# Patient Record
Sex: MALE | Race: WHITE | NOT HISPANIC OR LATINO | Employment: UNEMPLOYED | URBAN - METROPOLITAN AREA
[De-identification: names, ages, dates, MRNs, and addresses within clinical notes are randomized per-mention and may not be internally consistent; named-entity substitution may affect disease eponyms.]

---

## 2018-09-06 ENCOUNTER — HOSPITAL ENCOUNTER (EMERGENCY)
Facility: MEDICAL CENTER | Age: 28
End: 2018-09-06
Attending: EMERGENCY MEDICINE

## 2018-09-06 VITALS
WEIGHT: 295 LBS | HEART RATE: 98 BPM | OXYGEN SATURATION: 97 % | HEIGHT: 78 IN | TEMPERATURE: 98.9 F | DIASTOLIC BLOOD PRESSURE: 91 MMHG | SYSTOLIC BLOOD PRESSURE: 143 MMHG | BODY MASS INDEX: 34.13 KG/M2 | RESPIRATION RATE: 18 BRPM

## 2018-09-06 DIAGNOSIS — R56.9 SEIZURE (HCC): ICD-10-CM

## 2018-09-06 DIAGNOSIS — F10.10 ALCOHOL ABUSE: ICD-10-CM

## 2018-09-06 LAB
ALBUMIN SERPL BCP-MCNC: 4.5 G/DL (ref 3.2–4.9)
ALBUMIN/GLOB SERPL: 1.7 G/DL
ALP SERPL-CCNC: 48 U/L (ref 30–99)
ALT SERPL-CCNC: 57 U/L (ref 2–50)
ANION GAP SERPL CALC-SCNC: 8 MMOL/L (ref 0–11.9)
AST SERPL-CCNC: 42 U/L (ref 12–45)
BILIRUB SERPL-MCNC: 0.3 MG/DL (ref 0.1–1.5)
BUN SERPL-MCNC: 9 MG/DL (ref 8–22)
CALCIUM SERPL-MCNC: 9.4 MG/DL (ref 8.5–10.5)
CHLORIDE SERPL-SCNC: 107 MMOL/L (ref 96–112)
CO2 SERPL-SCNC: 30 MMOL/L (ref 20–33)
CREAT SERPL-MCNC: 1.11 MG/DL (ref 0.5–1.4)
GLOBULIN SER CALC-MCNC: 2.7 G/DL (ref 1.9–3.5)
GLUCOSE SERPL-MCNC: 103 MG/DL (ref 65–99)
POTASSIUM SERPL-SCNC: 3.8 MMOL/L (ref 3.6–5.5)
PROT SERPL-MCNC: 7.2 G/DL (ref 6–8.2)
SODIUM SERPL-SCNC: 145 MMOL/L (ref 135–145)

## 2018-09-06 PROCEDURE — 700102 HCHG RX REV CODE 250 W/ 637 OVERRIDE(OP): Performed by: EMERGENCY MEDICINE

## 2018-09-06 PROCEDURE — 80053 COMPREHEN METABOLIC PANEL: CPT

## 2018-09-06 PROCEDURE — 36415 COLL VENOUS BLD VENIPUNCTURE: CPT

## 2018-09-06 PROCEDURE — 99284 EMERGENCY DEPT VISIT MOD MDM: CPT

## 2018-09-06 PROCEDURE — A9270 NON-COVERED ITEM OR SERVICE: HCPCS | Performed by: EMERGENCY MEDICINE

## 2018-09-06 RX ORDER — CHLORDIAZEPOXIDE HYDROCHLORIDE 25 MG/1
50 CAPSULE, GELATIN COATED ORAL ONCE
Status: COMPLETED | OUTPATIENT
Start: 2018-09-06 | End: 2018-09-06

## 2018-09-06 RX ADMIN — CHLORDIAZEPOXIDE HYDROCHLORIDE 50 MG: 25 CAPSULE ORAL at 10:56

## 2018-09-06 ASSESSMENT — LIFESTYLE VARIABLES
HAVE PEOPLE ANNOYED YOU BY CRITICIZING YOUR DRINKING: YES
TOTAL SCORE: 4
DOES PATIENT WANT TO STOP DRINKING: NO
CONSUMPTION TOTAL: INCOMPLETE
EVER HAD A DRINK FIRST THING IN THE MORNING TO STEADY YOUR NERVES TO GET RID OF A HANGOVER: YES
EVER FELT BAD OR GUILTY ABOUT YOUR DRINKING: YES
TOTAL SCORE: 4
HAVE YOU EVER FELT YOU SHOULD CUT DOWN ON YOUR DRINKING: YES
TOTAL SCORE: 4
DO YOU DRINK ALCOHOL: YES

## 2018-09-06 NOTE — ED NOTES
Discharge instructions given, pt verbalized understanding.  A&ox4.  VSS.  Ambulates out of ER with wife.  Pt given lunch box.

## 2018-09-06 NOTE — DISCHARGE INSTRUCTIONS
Seizure, Adult  A seizure is a sudden burst of abnormal electrical activity in the brain. The abnormal activity temporarily interrupts normal brain function, causing a person to experience any of the following:  · Involuntary movements.  · Changes in awareness or consciousness.  · Uncontrollable shaking (convulsions).  Seizures usually last from 30 seconds to 2 minutes. They usually do not cause permanent brain damage unless they are prolonged.  What can cause a seizure to happen?  Seizures can happen for many reasons including:  · A fever.  · Low blood sugar.  · A medicine.  · An illnesses.  · A brain injury.  Some people who have a seizure never have another one. People who have repeated seizures have a condition called epilepsy.  What are the symptoms of a seizure?  Symptoms of a seizure vary greatly from person to person. They include:  · Convulsions.  · Stiffening of the body.  · Involuntary movements of the arms or legs.  · Loss of consciousness.  · Breathing problems.  · Falling suddenly.  · Confusion.  · Head nodding.  · Eye blinking or fluttering.  · Lip smacking.  · Drooling.  · Rapid eye movements.  · Grunting.  · Loss of bladder control and bowel control.  · Staring.  · Unresponsiveness.  Some people have symptoms right before a seizure happens (aura) and right after a seizure happens. Symptoms of an aura include:  · Fear or anxiety.  · Nausea.  · Feeling like the room is spinning (vertigo).  · A feeling of having seen or heard something before (demetria vu).  · Odd tastes or smells.  · Changes in vision, such as seeing flashing lights or spots.  Symptoms that may follow a seizure include:  · Confusion.  · Sleepiness.  · Headache.  · Weakness of one side of the body.  Follow these instructions at home:  Medicines  · Take over-the-counter and prescription medicines only as told by your health care provider.  · Avoid any substances that may prevent your medicine from working properly, such as  alcohol.  Activity  · Do not drive, swim, or do any other activities that would be dangerous if you had another seizure. Wait until your health care provider approves.  · If you live in the [place] check with your local DMV (department of motor vehicles) to find out about the local driving laws. Each state has specific rules about when you can legally return to driving.  · Get enough rest. Lack of sleep can make seizures more likely to occur.  Educating others  Teach friends and family what to do if you have a seizure. They should:  · Lay you on the ground to prevent a fall.  · Cushion your head and body.  · Loosen any tight clothing around your neck.  · Turn you on your side. If vomiting occurs, this helps keep your airway clear.  · Stay with you until you recover.  · Not hold you down. Holding you down will not stop the seizure.  · Not put anything in your mouth.  · Know whether or not you need emergency care.  General instructions  · Contact your health care provider each time you have a seizure.  · Avoid anything that has ever triggered a seizure for you.  · Keep a seizure diary. Record what you remember about each seizure, especially anything that might have triggered the seizure.  · Keep all follow-up visits as told by your health care provider. This is important.  Contact a health care provider if:  · You have another seizure.  · You have seizures more often.  · Your seizure symptoms change.  · You continue to have seizures with treatment.  · You have symptoms of an infection or illness. They might increase your risk of having a seizure.  Get help right away if:  · You have a seizure:  ¨ That lasts longer than 5 minutes.  ¨ That is different than previous seizures.  ¨ That leaves you unable to speak or use a part of your body.  ¨ That makes it harder to breathe.  ¨ After a head injury.  · You have:  ¨ Multiple seizures in a row.  ¨ Confusion or a severe headache right after a seizure.  · You are having seizures  more often.  · You do not wake up immediately after a seizure.  · You injure yourself during a seizure.  These symptoms may represent a serious problem that is an emergency. Do not wait to see if the symptoms will go away. Get medical help right away. Call your local emergency services (911 in the [place] Do not drive yourself to the hospital.   This information is not intended to replace advice given to you by your health care provider. Make sure you discuss any questions you have with your health care provider.  Document Released: 12/15/2001 Document Revised: 08/13/2017 Document Reviewed: 07/21/2017  [employer] Interactive Patient Education © 2017 [employer]      Alcohol Problems  Most adults who drink alcohol drink in moderation (not a lot) are at low risk for developing problems related to their drinking. However, all drinkers, including low-risk drinkers, should know about the health risks connected with drinking alcohol.  RECOMMENDATIONS FOR LOW-RISK DRINKING   Drink in moderation. Moderate drinking is defined as follows:   · Men - no more than 2 drinks per day.  · Nonpregnant women - no more than 1 drink per day.  · Over age 65 - no more than 1 drink per day.  A standard drink is 12 grams of pure alcohol, which is equal to a 12 ounce bottle of beer or wine cooler, a 5 ounce glass of wine, or 1.5 ounces of distilled spirits (such as whiskey, francisca, vodka, or rum).   ABSTAIN FROM (DO NOT DRINK) ALCOHOL:  · When pregnant or considering pregnancy.  · When taking a medication that interacts with alcohol.  · If you are alcohol dependent.  · A medical condition that prohibits drinking alcohol (such as ulcer, liver disease, or heart disease).  DISCUSS WITH YOUR CAREGIVER:  · If you are at risk for coronary heart disease, discuss the potential benefits and risks of alcohol use: Light to moderate drinking is associated with lower rates of coronary heart disease in certain populations (for example, men over age 45 and  postmenopausal women). Infrequent or nondrinkers are advised not to begin light to moderate drinking to reduce the risk of coronary heart disease so as to avoid creating an alcohol-related problem. Similar protective effects can likely be gained through proper diet and exercise.  · Women and the elderly have smaller amounts of body water than men. As a result women and the elderly achieve a higher blood alcohol concentration after drinking the same amount of alcohol.  · Exposing a fetus to alcohol can cause a broad range of birth defects referred to as Fetal Alcohol Syndrome (FAS) or Alcohol-Related Birth Defects (ARBD). Although FAS/ARBD is connected with excessive alcohol consumption during pregnancy, studies also have reported neurobehavioral problems in infants born to mothers reporting drinking an average of 1 drink per day during pregnancy.  · Heavier drinking (the consumption of more than 4 drinks per occasion by men and more than 3 drinks per occasion by women) impairs learning (cognitive) and psychomotor functions and increases the risk of alcohol-related problems, including accidents and injuries.  CAGE QUESTIONS:   · Have you ever felt that you should Cut down on your drinking?  · Have people Annoyed you by criticizing your drinking?  · Have you ever felt bad or Guilty about your drinking?  · Have you ever had a drink first thing in the morning to steady your nerves or get rid of a hangover (Eye opener)?  If you answered positively to any of these questions: You may be at risk for alcohol-related problems if alcohol consumption is:   · Men: Greater than 14 drinks per week or more than 4 drinks per occasion.  · Women: Greater than 7 drinks per week or more than 3 drinks per occasion.  Do you or your family have a medical history of alcohol-related problems, such as:  · Blackouts.  · Sexual dysfunction.  · Depression.  · Trauma.  · Liver dysfunction.  · Sleep disorders.  · Hypertension.  · Chronic abdominal  pain.  · Has your drinking ever caused you problems, such as problems with your family, problems with your work (or school) performance, or accidents/injuries?  · Do you have a compulsion to drink or a preoccupation with drinking?  · Do you have poor control or are you unable to stop drinking once you have started?  · Do you have to drink to avoid withdrawal symptoms?  · Do you have problems with withdrawal such as tremors, nausea, sweats, or mood disturbances?  · Does it take more alcohol than in the past to get you high?  · Do you feel a strong urge to drink?  · Do you change your plans so that you can have a drink?  · Do you ever drink in the morning to relieve the shakes or a hangover?  If you have answered a number of the previous questions positively, it may be time for you to talk to your caregivers, family, and friends and see if they think you have a problem. Alcoholism is a chemical dependency that keeps getting worse and will eventually destroy your health and relationships. Many alcoholics end up dead, impoverished, or in FDC. This is often the end result of all chemical dependency.  · Do not be discouraged if you are not ready to take action immediately.  · Decisions to change behavior often involve up and down desires to change and feeling like you cannot decide.  · Try to think more seriously about your drinking behavior.  · Think of the reasons to quit.  WHERE TO GO FOR ADDITIONAL INFORMATION   · The National [place] on Alcohol Abuse and Alcoholism (NIAAA)  www.niaaa.nih.gov  · [place] on Alcoholism and Drug Dependence (NCADD)  www.ncadd.org  · American Society of Addiction Medicine (ASAM)  www.asam.org   Document Released: 12/18/2006 Document Revised: 03/11/2013 Document Reviewed: 08/05/2009  [employer]® Patient Information ©2014 [employer].

## 2018-09-06 NOTE — ED PROVIDER NOTES
ED Provider Note    Scribed for Darell Us M.D. by Jorge Holley. 9/6/2018  10:46 AM    Primary care provider: Pcp Pt States None  Means of arrival: EMS  History obtained from: patient  History limited by: none    CHIEF COMPLAINT  Chief Complaint   Patient presents with   • Seizure     witnessed by friend, approx 45 seconds       HPI  Crow Edwards is a 28 y.o. male with a history of history of withdrawal seizures who presents to the Emergency Department for evaluation post sudden seizure just prior to arrival. Wife repots associated tremors, unresponsiveness. Patient reports a history of heavy alcohol abuse. He states that he was incarcerated for 72 hours after a night of heavy drinking. Wife reports that the patient was at the [facility] today after being released from [facility] when he started to become tremulous. She states that he was given an alcoholic beverage which seemed to improve his symptoms. His last alcohol beverage was at 9 am. Patient was sitting at breakfast when he suddenly became tremulous and unresponsive for 45 seconds. She reports 3 separate episodes of seizures over a 10 minute time period. Patient denies headache, vomiting.     REVIEW OF SYSTEMS  Pertinent positives include seizure, tremors, unresponsiveness. Pertinent negatives include no headache, vomiting.  All other systems reviewed and negative.  C.    PAST MEDICAL HISTORY   withdrawal seizures     SURGICAL HISTORY  patient denies any surgical history    SOCIAL HISTORY  Social History   Substance Use Topics   • Smoking status: Light Tobacco Smoker   • Smokeless tobacco: Not on file   • Alcohol use Yes      Comment: daily      History   Drug Use No       FAMILY HISTORY  History reviewed. No pertinent family history.    CURRENT MEDICATIONS  Home Medications     Reviewed by Tami Ray R.N. (Registered Nurse) on 09/06/18 at 1022  Med List Status: Complete   Medication Last Dose Status        Patient Bhavesh Taking any  "Medications                       ALLERGIES  No Known Allergies    PHYSICAL EXAM  VITAL SIGNS: /91   Pulse 86   Temp 37.2 °C (98.9 °F)   Resp 20   Ht 1.981 m (6' 6\")   Wt (!) 133.8 kg (295 lb)   SpO2 95%   BMI 34.09 kg/m²     Constitutional: Well developed, Well nourished, Mild to moderate distress, Non-toxic appearance.   HENT: Normocephalic, Atraumatic, Bilateral external ears normal, Oropharynx moist, No oral exudates.   Eyes: PERRLA, EOMI, Conjunctiva normal, No discharge.   Neck: No tenderness, Supple, No stridor.   Lymphatic: No lymphadenopathy noted.   Cardiovascular: Normal heart rate, Normal rhythm.   Thorax & Lungs: Clear to auscultation bilaterally, No respiratory distress, No wheezing, No crackles.   Abdomen: Soft, No tenderness, No masses, No pulsatile masses.   Skin: Warm, Dry, No erythema, No rash.   Extremities:, No edema No cyanosis.   Musculoskeletal: No tenderness to palpation or major deformities noted.  Intact distal pulses  Neurologic: Awake, alert. Moves all extremities spontaneously. Oriented to 4  Psychiatric: Anxious, Judgment normal, Mood normal.     LABS  Labs Reviewed   COMP METABOLIC PANEL - Abnormal; Notable for the following:        Result Value    Glucose 103 (*)     ALT(SGPT) 57 (*)     All other components within normal limits   ESTIMATED GFR   All labs reviewed by me.    COURSE & MEDICAL DECISION MAKING  Pertinent Labs & Imaging studies reviewed. (See chart for details)    10:46 AM - Patient seen and examined at bedside. He is expressing a desire to leave AMA. I discussed evaluation today to rule out emergent processes. I encouraged the patient to remain for these studies and to allow treatment with Ativan or librium in order to prevent further episodes of seizures. Wife is also concerned for the patient and attempting to get him to remain in the ED. Patient agrees to remain for the course of the diagnostics and to PO Librium, Patient will be treated with Librium 50 " "mg. Ordered CMP to evaluate his symptoms. The differential diagnoses include but are not limited to: alcohol withdrawal seizure, electrolyte imbalance, substance abuse    11:41 AM - Recheck: Patient re-evaluated at [facility]. Patient reports feeling good and ready for discharge. Patient's lab and radiology results discussed. Discussed patient's condition and treatment plan. Patient will be discharged with instructions and provided with strict return precautions. Advised to follow up with a neurologist. Instructed to return to Emergency Department immediately if any new or worsening symptoms.    Discharge vitals: /91   Pulse 98   Temp 37.2 °C (98.9 °F)   Resp 18   Ht 1.981 m (6' 6\")   Wt (!) 133.8 kg (295 lb)   SpO2 97%   BMI 34.09 kg/m²     Decision Making:  Patient with alcohol abuse, likely seizure secondary to heavy alcohol intoxication, no evidence of any trauma.  The patient is agitated, the electrolytes are unremarkable, the patient has no desire to quit drinking alcohol, to the patient will be discharged home, return with any other concerns.    The patient will return for new or worsening symptoms and is stable at the time of discharge.    The patient is referred to a primary physician for blood pressure management, diabetic screening, and for all other preventative health concerns.    DISPOSITION:  Patient will be discharged home in stable condition.    FOLLOW UP:  [facility], Emergency Dept  [address] [address] [phone]    If symptoms worsen    Bhavik Hatfield M.D.   [address]  [address] [address] [phone]          FINAL IMPRESSION  1. Seizure (HCC)    2. Alcohol abuse          Jorge BURGOS ([place]), am scribing for, and in the presence of, Darell Us M.D..    Electronically signed by: Jorge Holley ([place]), 9/6/2018    Darell BURGOS M.D. personally performed the services described in this documentation, as " scribed by Jorge Holley in my presence, and it is both accurate and complete.    The note accurately reflects work and decisions made by me.  Darell Us  9/6/2018  6:19 PM

## 2018-09-06 NOTE — ED NOTES
"Pt bib [facility] from [facility] with c/c witness seizure x45 seconds.  Pt history of seizures due to alcohol abuse.  Pt released from [facility] last night (in [facility] for 3 days) & \"drank heavily last night.\"  Per EMS, pt confused on scene.  Pt arrives a&ox4, \"angry\" he is in the hospital.  Pt received 4mg zofran & 300mL normal saline by medics PTA.  On cardiac monitor.  Blood drawn & sent to lab.  Chart up for ERP evaluation.   "

## 2018-09-07 ENCOUNTER — HOSPITAL ENCOUNTER (INPATIENT)
Facility: MEDICAL CENTER | Age: 28
LOS: 2 days | DRG: 101 | End: 2018-09-09
Attending: EMERGENCY MEDICINE | Admitting: HOSPITALIST
Payer: MEDICAID

## 2018-09-07 ENCOUNTER — APPOINTMENT (OUTPATIENT)
Dept: RADIOLOGY | Facility: MEDICAL CENTER | Age: 28
DRG: 101 | End: 2018-09-07
Attending: EMERGENCY MEDICINE
Payer: MEDICAID

## 2018-09-07 DIAGNOSIS — R56.9 SEIZURE (HCC): ICD-10-CM

## 2018-09-07 DIAGNOSIS — F10.20 ALCOHOLISM (HCC): ICD-10-CM

## 2018-09-07 DIAGNOSIS — F10.939 ALCOHOL WITHDRAWAL SYNDROME WITH COMPLICATION (HCC): ICD-10-CM

## 2018-09-07 PROBLEM — Z72.0 TOBACCO ABUSE: Status: ACTIVE | Noted: 2018-09-07

## 2018-09-07 PROBLEM — E87.20 ACIDOSIS: Status: ACTIVE | Noted: 2018-09-07

## 2018-09-07 PROBLEM — K70.10 ALCOHOLIC HEPATITIS: Status: ACTIVE | Noted: 2018-09-07

## 2018-09-07 LAB
ALBUMIN SERPL BCP-MCNC: 4.5 G/DL (ref 3.2–4.9)
ALBUMIN/GLOB SERPL: 1.4 G/DL
ALP SERPL-CCNC: 52 U/L (ref 30–99)
ALT SERPL-CCNC: 66 U/L (ref 2–50)
ANION GAP SERPL CALC-SCNC: 17 MMOL/L (ref 0–11.9)
AST SERPL-CCNC: 70 U/L (ref 12–45)
BASOPHILS # BLD AUTO: 0.8 % (ref 0–1.8)
BASOPHILS # BLD: 0.06 K/UL (ref 0–0.12)
BILIRUB SERPL-MCNC: 0.4 MG/DL (ref 0.1–1.5)
BUN SERPL-MCNC: 14 MG/DL (ref 8–22)
CALCIUM SERPL-MCNC: 9.2 MG/DL (ref 8.5–10.5)
CHLORIDE SERPL-SCNC: 108 MMOL/L (ref 96–112)
CO2 SERPL-SCNC: 19 MMOL/L (ref 20–33)
CREAT SERPL-MCNC: 0.8 MG/DL (ref 0.5–1.4)
EOSINOPHIL # BLD AUTO: 0.19 K/UL (ref 0–0.51)
EOSINOPHIL NFR BLD: 2.4 % (ref 0–6.9)
ERYTHROCYTE [DISTWIDTH] IN BLOOD BY AUTOMATED COUNT: 43.2 FL (ref 35.9–50)
ETHANOL BLD-MCNC: 0.31 G/DL
GLOBULIN SER CALC-MCNC: 3.2 G/DL (ref 1.9–3.5)
GLUCOSE SERPL-MCNC: 105 MG/DL (ref 65–99)
HCT VFR BLD AUTO: 48 % (ref 42–52)
HGB BLD-MCNC: 16 G/DL (ref 14–18)
IMM GRANULOCYTES # BLD AUTO: 0.04 K/UL (ref 0–0.11)
IMM GRANULOCYTES NFR BLD AUTO: 0.5 % (ref 0–0.9)
LYMPHOCYTES # BLD AUTO: 3.13 K/UL (ref 1–4.8)
LYMPHOCYTES NFR BLD: 39.9 % (ref 22–41)
MCH RBC QN AUTO: 31.4 PG (ref 27–33)
MCHC RBC AUTO-ENTMCNC: 33.3 G/DL (ref 33.7–35.3)
MCV RBC AUTO: 94.1 FL (ref 81.4–97.8)
MONOCYTES # BLD AUTO: 0.71 K/UL (ref 0–0.85)
MONOCYTES NFR BLD AUTO: 9 % (ref 0–13.4)
NEUTROPHILS # BLD AUTO: 3.72 K/UL (ref 1.82–7.42)
NEUTROPHILS NFR BLD: 47.4 % (ref 44–72)
NRBC # BLD AUTO: 0 K/UL
NRBC BLD-RTO: 0 /100 WBC
PLATELET # BLD AUTO: 287 K/UL (ref 164–446)
PMV BLD AUTO: 9.3 FL (ref 9–12.9)
POTASSIUM SERPL-SCNC: 4.7 MMOL/L (ref 3.6–5.5)
PROT SERPL-MCNC: 7.7 G/DL (ref 6–8.2)
RBC # BLD AUTO: 5.1 M/UL (ref 4.7–6.1)
SODIUM SERPL-SCNC: 144 MMOL/L (ref 135–145)
TSH SERPL DL<=0.005 MIU/L-ACNC: 1.1 UIU/ML (ref 0.38–5.33)
WBC # BLD AUTO: 7.9 K/UL (ref 4.8–10.8)

## 2018-09-07 PROCEDURE — 83036 HEMOGLOBIN GLYCOSYLATED A1C: CPT

## 2018-09-07 PROCEDURE — 70450 CT HEAD/BRAIN W/O DYE: CPT

## 2018-09-07 PROCEDURE — A9270 NON-COVERED ITEM OR SERVICE: HCPCS | Performed by: HOSPITALIST

## 2018-09-07 PROCEDURE — 93005 ELECTROCARDIOGRAM TRACING: CPT | Performed by: EMERGENCY MEDICINE

## 2018-09-07 PROCEDURE — 770020 HCHG ROOM/CARE - TELE (206)

## 2018-09-07 PROCEDURE — 700101 HCHG RX REV CODE 250: Performed by: HOSPITALIST

## 2018-09-07 PROCEDURE — HZ2ZZZZ DETOXIFICATION SERVICES FOR SUBSTANCE ABUSE TREATMENT: ICD-10-PCS | Performed by: HOSPITALIST

## 2018-09-07 PROCEDURE — 96365 THER/PROPH/DIAG IV INF INIT: CPT

## 2018-09-07 PROCEDURE — 99407 BEHAV CHNG SMOKING > 10 MIN: CPT | Performed by: HOSPITALIST

## 2018-09-07 PROCEDURE — 700102 HCHG RX REV CODE 250 W/ 637 OVERRIDE(OP): Performed by: HOSPITALIST

## 2018-09-07 PROCEDURE — 700105 HCHG RX REV CODE 258: Performed by: HOSPITALIST

## 2018-09-07 PROCEDURE — 700111 HCHG RX REV CODE 636 W/ 250 OVERRIDE (IP): Performed by: HOSPITALIST

## 2018-09-07 PROCEDURE — 85025 COMPLETE CBC W/AUTO DIFF WBC: CPT

## 2018-09-07 PROCEDURE — 80053 COMPREHEN METABOLIC PANEL: CPT

## 2018-09-07 PROCEDURE — 700101 HCHG RX REV CODE 250: Performed by: EMERGENCY MEDICINE

## 2018-09-07 PROCEDURE — 99223 1ST HOSP IP/OBS HIGH 75: CPT | Mod: 25 | Performed by: HOSPITALIST

## 2018-09-07 PROCEDURE — 80307 DRUG TEST PRSMV CHEM ANLYZR: CPT

## 2018-09-07 PROCEDURE — 99285 EMERGENCY DEPT VISIT HI MDM: CPT

## 2018-09-07 PROCEDURE — 84443 ASSAY THYROID STIM HORMONE: CPT

## 2018-09-07 RX ORDER — LORAZEPAM 1 MG/1
2 TABLET ORAL
Status: DISCONTINUED | OUTPATIENT
Start: 2018-09-07 | End: 2018-09-09 | Stop reason: HOSPADM

## 2018-09-07 RX ORDER — CHLORDIAZEPOXIDE HYDROCHLORIDE 25 MG/1
50 CAPSULE, GELATIN COATED ORAL EVERY 6 HOURS
Status: DISPENSED | OUTPATIENT
Start: 2018-09-07 | End: 2018-09-08

## 2018-09-07 RX ORDER — LORAZEPAM 2 MG/ML
1.5 INJECTION INTRAMUSCULAR
Status: DISCONTINUED | OUTPATIENT
Start: 2018-09-07 | End: 2018-09-09 | Stop reason: HOSPADM

## 2018-09-07 RX ORDER — POLYETHYLENE GLYCOL 3350 17 G/17G
1 POWDER, FOR SOLUTION ORAL
Status: DISCONTINUED | OUTPATIENT
Start: 2018-09-07 | End: 2018-09-09 | Stop reason: HOSPADM

## 2018-09-07 RX ORDER — LORAZEPAM 0.5 MG/1
0.5 TABLET ORAL EVERY 4 HOURS PRN
Status: DISCONTINUED | OUTPATIENT
Start: 2018-09-07 | End: 2018-09-09 | Stop reason: HOSPADM

## 2018-09-07 RX ORDER — LORAZEPAM 2 MG/ML
2 INJECTION INTRAMUSCULAR
Status: DISCONTINUED | OUTPATIENT
Start: 2018-09-07 | End: 2018-09-09 | Stop reason: HOSPADM

## 2018-09-07 RX ORDER — AMOXICILLIN 250 MG
2 CAPSULE ORAL 2 TIMES DAILY
Status: DISCONTINUED | OUTPATIENT
Start: 2018-09-07 | End: 2018-09-09 | Stop reason: HOSPADM

## 2018-09-07 RX ORDER — LORAZEPAM 1 MG/1
4 TABLET ORAL
Status: DISCONTINUED | OUTPATIENT
Start: 2018-09-07 | End: 2018-09-09 | Stop reason: HOSPADM

## 2018-09-07 RX ORDER — THIAMINE MONONITRATE (VIT B1) 100 MG
100 TABLET ORAL DAILY
Status: DISCONTINUED | OUTPATIENT
Start: 2018-09-08 | End: 2018-09-09 | Stop reason: HOSPADM

## 2018-09-07 RX ORDER — FOLIC ACID 1 MG/1
1 TABLET ORAL DAILY
Status: DISCONTINUED | OUTPATIENT
Start: 2018-09-08 | End: 2018-09-09 | Stop reason: HOSPADM

## 2018-09-07 RX ORDER — BISACODYL 10 MG
10 SUPPOSITORY, RECTAL RECTAL
Status: DISCONTINUED | OUTPATIENT
Start: 2018-09-07 | End: 2018-09-09 | Stop reason: HOSPADM

## 2018-09-07 RX ORDER — LORAZEPAM 2 MG/ML
1 INJECTION INTRAMUSCULAR
Status: DISCONTINUED | OUTPATIENT
Start: 2018-09-07 | End: 2018-09-09 | Stop reason: HOSPADM

## 2018-09-07 RX ORDER — LORAZEPAM 1 MG/1
1 TABLET ORAL EVERY 4 HOURS PRN
Status: DISCONTINUED | OUTPATIENT
Start: 2018-09-07 | End: 2018-09-09 | Stop reason: HOSPADM

## 2018-09-07 RX ORDER — ONDANSETRON 4 MG/1
4 TABLET, ORALLY DISINTEGRATING ORAL EVERY 4 HOURS PRN
Status: DISCONTINUED | OUTPATIENT
Start: 2018-09-07 | End: 2018-09-09 | Stop reason: HOSPADM

## 2018-09-07 RX ORDER — CHLORDIAZEPOXIDE HYDROCHLORIDE 25 MG/1
25 CAPSULE, GELATIN COATED ORAL EVERY 6 HOURS
Status: DISCONTINUED | OUTPATIENT
Start: 2018-09-08 | End: 2018-09-09 | Stop reason: HOSPADM

## 2018-09-07 RX ORDER — PROMETHAZINE HYDROCHLORIDE 25 MG/1
12.5-25 TABLET ORAL EVERY 4 HOURS PRN
Status: DISCONTINUED | OUTPATIENT
Start: 2018-09-07 | End: 2018-09-09 | Stop reason: HOSPADM

## 2018-09-07 RX ORDER — LORAZEPAM 2 MG/ML
0.5 INJECTION INTRAMUSCULAR EVERY 4 HOURS PRN
Status: DISCONTINUED | OUTPATIENT
Start: 2018-09-07 | End: 2018-09-09 | Stop reason: HOSPADM

## 2018-09-07 RX ORDER — LORAZEPAM 1 MG/1
3 TABLET ORAL
Status: DISCONTINUED | OUTPATIENT
Start: 2018-09-07 | End: 2018-09-09 | Stop reason: HOSPADM

## 2018-09-07 RX ORDER — ONDANSETRON 2 MG/ML
4 INJECTION INTRAMUSCULAR; INTRAVENOUS EVERY 4 HOURS PRN
Status: DISCONTINUED | OUTPATIENT
Start: 2018-09-07 | End: 2018-09-09 | Stop reason: HOSPADM

## 2018-09-07 RX ORDER — PROMETHAZINE HYDROCHLORIDE 25 MG/1
12.5-25 SUPPOSITORY RECTAL EVERY 4 HOURS PRN
Status: DISCONTINUED | OUTPATIENT
Start: 2018-09-07 | End: 2018-09-09 | Stop reason: HOSPADM

## 2018-09-07 RX ADMIN — THIAMINE HYDROCHLORIDE: 100 INJECTION, SOLUTION INTRAMUSCULAR; INTRAVENOUS at 17:00

## 2018-09-07 RX ADMIN — CHLORDIAZEPOXIDE HYDROCHLORIDE 50 MG: 25 CAPSULE ORAL at 19:54

## 2018-09-07 RX ADMIN — LORAZEPAM 3 MG: 1 TABLET ORAL at 19:53

## 2018-09-07 RX ADMIN — POTASSIUM CHLORIDE: 2 INJECTION, SOLUTION, CONCENTRATE INTRAVENOUS at 21:39

## 2018-09-07 ASSESSMENT — LIFESTYLE VARIABLES
TOTAL SCORE: 0
EVER FELT BAD OR GUILTY ABOUT YOUR DRINKING: NO
EVER HAD A DRINK FIRST THING IN THE MORNING TO STEADY YOUR NERVES TO GET RID OF A HANGOVER: NO
TREMOR: *
ORIENTATION AND CLOUDING OF SENSORIUM: ORIENTED AND CAN DO SERIAL ADDITIONS
HAVE PEOPLE ANNOYED YOU BY CRITICIZING YOUR DRINKING: NO
AGITATION: *
HAVE PEOPLE ANNOYED YOU BY CRITICIZING YOUR DRINKING: NO
ORIENTATION AND CLOUDING OF SENSORIUM: ORIENTED AND CAN DO SERIAL ADDITIONS
AUDITORY DISTURBANCES: MILD HARSHNESS OR ABILITY TO FRIGHTEN
TOTAL SCORE: VERY MILD ITCHING, PINS AND NEEDLES SENSATION, BURNING OR NUMBNESS
TOTAL SCORE: 19
ANXIETY: MODERATELY ANXIOUS OR GUARDED, SO ANXIETY IS INFERRED
PAROXYSMAL SWEATS: BARELY PERCEPTIBLE SWEATING. PALMS MOIST
AGITATION: NORMAL ACTIVITY
CONSUMPTION TOTAL: INCOMPLETE
VISUAL DISTURBANCES: MILD SENSITIVITY
HAVE YOU EVER FELT YOU SHOULD CUT DOWN ON YOUR DRINKING: NO
NAUSEA AND VOMITING: NO NAUSEA AND NO VOMITING
ALCOHOL_USE: YES
TACTILE DISTURBANCES: MILD ITCHING, PINS AND NEEDLES SENSATION, BURNING OR NUMBNESS
EVER HAD A DRINK FIRST THING IN THE MORNING TO STEADY YOUR NERVES TO GET RID OF A HANGOVER: NO
TOTAL SCORE: 0
TOTAL SCORE: 4
ANXIETY: NO ANXIETY (AT EASE)
ON A TYPICAL DAY WHEN YOU DRINK ALCOHOL HOW MANY DRINKS DO YOU HAVE: 7
TREMOR: *
PAROXYSMAL SWEATS: BARELY PERCEPTIBLE SWEATING. PALMS MOIST
DO YOU DRINK ALCOHOL: YES
SUBSTANCE_ABUSE: 0
HAVE YOU EVER FELT YOU SHOULD CUT DOWN ON YOUR DRINKING: NO
TOTAL SCORE: 0
AUDITORY DISTURBANCES: NOT PRESENT
TOTAL SCORE: 0
TOTAL SCORE: 0
NAUSEA AND VOMITING: *
EVER FELT BAD OR GUILTY ABOUT YOUR DRINKING: NO
AVERAGE NUMBER OF DAYS PER WEEK YOU HAVE A DRINK CONTAINING ALCOHOL: 7
TOTAL SCORE: 0
VISUAL DISTURBANCES: NOT PRESENT
HOW MANY TIMES IN THE PAST YEAR HAVE YOU HAD 5 OR MORE DRINKS IN A DAY: 365
CONSUMPTION TOTAL: POSITIVE
HEADACHE, FULLNESS IN HEAD: NOT PRESENT
HEADACHE, FULLNESS IN HEAD: NOT PRESENT

## 2018-09-07 ASSESSMENT — COGNITIVE AND FUNCTIONAL STATUS - GENERAL
CLIMB 3 TO 5 STEPS WITH RAILING: A LOT
DRESSING REGULAR LOWER BODY CLOTHING: A LITTLE
MOBILITY SCORE: 16
TURNING FROM BACK TO SIDE WHILE IN FLAT BAD: A LITTLE
HELP NEEDED FOR BATHING: A LITTLE
MOVING TO AND FROM BED TO CHAIR: A LITTLE
WALKING IN HOSPITAL ROOM: A LOT
PERSONAL GROOMING: A LITTLE
DAILY ACTIVITIY SCORE: 18
MOBILITY SCORE: 16
DRESSING REGULAR UPPER BODY CLOTHING: A LITTLE
EATING MEALS: A LITTLE
DRESSING REGULAR LOWER BODY CLOTHING: A LITTLE
TURNING FROM BACK TO SIDE WHILE IN FLAT BAD: A LITTLE
MOVING FROM LYING ON BACK TO SITTING ON SIDE OF FLAT BED: A LITTLE
MOVING TO AND FROM BED TO CHAIR: A LITTLE
STANDING UP FROM CHAIR USING ARMS: A LITTLE
SUGGESTED CMS G CODE MODIFIER MOBILITY: CK
SUGGESTED CMS G CODE MODIFIER MOBILITY: CK
EATING MEALS: A LITTLE
STANDING UP FROM CHAIR USING ARMS: A LITTLE
WALKING IN HOSPITAL ROOM: A LOT
MOVING FROM LYING ON BACK TO SITTING ON SIDE OF FLAT BED: A LITTLE
PERSONAL GROOMING: A LITTLE
DRESSING REGULAR UPPER BODY CLOTHING: A LITTLE
TOILETING: A LITTLE
TOILETING: A LITTLE
SUGGESTED CMS G CODE MODIFIER DAILY ACTIVITY: CK
CLIMB 3 TO 5 STEPS WITH RAILING: A LOT

## 2018-09-07 ASSESSMENT — ENCOUNTER SYMPTOMS
BLURRED VISION: 0
SEIZURES: 1
BRUISES/BLEEDS EASILY: 0
WEAKNESS: 0
SHORTNESS OF BREATH: 0
FLANK PAIN: 0
EYE DISCHARGE: 0
HEARTBURN: 0
FEVER: 0
DOUBLE VISION: 0
SPEECH CHANGE: 0
TREMORS: 1
COUGH: 0
PALPITATIONS: 0
CHILLS: 0
NAUSEA: 0
DEPRESSION: 0
MYALGIAS: 0
HALLUCINATIONS: 0
DIZZINESS: 0
ABDOMINAL PAIN: 0
HEMOPTYSIS: 0
SENSORY CHANGE: 0
FOCAL WEAKNESS: 0
VOMITING: 0

## 2018-09-07 ASSESSMENT — PAIN SCALES - GENERAL: PAINLEVEL_OUTOF10: 0

## 2018-09-07 ASSESSMENT — PATIENT HEALTH QUESTIONNAIRE - PHQ9
2. FEELING DOWN, DEPRESSED, IRRITABLE, OR HOPELESS: NOT AT ALL
1. LITTLE INTEREST OR PLEASURE IN DOING THINGS: NOT AT ALL
SUM OF ALL RESPONSES TO PHQ9 QUESTIONS 1 AND 2: 0
SUM OF ALL RESPONSES TO PHQ9 QUESTIONS 1 AND 2: 0

## 2018-09-07 NOTE — ED PROVIDER NOTES
"ED Provider Note    CHIEF COMPLAINT  Chief Complaint   Patient presents with   • Seizure       HPI  Crow Edwards is a 28 y.o. male who presents \"I had a seizure, I am going through withdrawals\"this is a patient with a history of a heavy drinking history.  He was incarcerated for 3 days, and then started having severe DTs, was actually seen here yesterday for a seizure.  Laboratories were checked he seemed fine after some Librium, he was discharged home.  He has continued to drink, however not as much as he typically does, and today had 4 or 5 seizures, each lasting for about 45 seconds.  His wife, who is witnessed him, describes him as having generalized shaking, and followed by confusion afterwards.  The patient denies any pain anywhere, pointing out that the only thing he heard \"is my pride.\"  He was incontinent of urine.  Denies any headache, neck pain, chest pain or shortness of breath.  There is no cough.  No extremity symptoms at all.  There is no other complaint.    PAST MEDICAL HISTORY  He reports alcoholism    FAMILY HISTORY  No family history on file.    SOCIAL HISTORY  Social History   Substance Use Topics   • Smoking status: Light Tobacco Smoker   • Smokeless tobacco: Not on file   • Alcohol use Yes      Comment: daily     Is here with his wife who also reports alcoholism    SURGICAL HISTORY  No past surgical history on file.    CURRENT MEDICATIONS    I have reviewed the nurses notes and/or the list brought with the patient.    ALLERGIES  No Known Allergies    REVIEW OF SYSTEMS  See HPI for further details. Review of systems as above, otherwise all other systems are negative.     PHYSICAL EXAM  VITAL SIGNS: /93   Pulse 98   Temp 36.9 °C (98.5 °F)   Resp 18   Ht 1.981 m (6' 6\")   Wt (!) 133.8 kg (295 lb)   BMI 34.09 kg/m²     Constitutional: Well appearing patient in no acute distress.  Not toxic, nor ill in appearance.  Speech is somewhat slurred.  HENT: Mucus membranes moist.  " Oropharynx is clear.  Head is without sign of trauma.  Eyes: Pupils equally round.  No scleral icterus.   Neck: Full nontender range of motion.  Lymphatic: No cervical lymphadenopathy noted.   Cardiovascular: Mildly tachycardic, right at 100, sinus on the monitor.  No murmurs, rubs, nor gallop appreciated.   Thorax & Lungs: Chest is nontender.  Lungs are clear to auscultation with good air movement bilaterally.  No wheeze, rhonchi, nor rales.   Abdomen: Soft, with no tenderness, rebound nor guarding.  No mass, pulsatile mass, nor hepatosplenomegaly appreciated.  Skin: No purpura nor petechia noted.  Extremities/Musculoskeletal: No sign of trauma.  Calves are nontender with no cords nor edema.  No Liu's sign.  Pulses are intact all around.   Neurologic: Alert & oriented.  Strength and sensation is intact all around.  Psychiatric: Normal affect appropriate for the clinical situation.      LABS  Labs Reviewed   DIAGNOSTIC ALCOHOL - Abnormal; Notable for the following:        Result Value    Diagnostic Alcohol 0.31 (*)     All other components within normal limits   CBC WITH DIFFERENTIAL - Abnormal; Notable for the following:     MCHC 33.3 (*)     All other components within normal limits   COMP METABOLIC PANEL - Abnormal; Notable for the following:     Co2 19 (*)     Anion Gap 17.0 (*)     Glucose 105 (*)     AST(SGOT) 70 (*)     ALT(SGPT) 66 (*)     All other components within normal limits   TSH   ESTIMATED GFR   HEMOGLOBIN A1C   URINE DRUG SCREEN        RADIOLOGY/PROCEDURES  I have reviewed the patient's film interpretations myself, and they are read out by the radiologist as:   CT-HEAD W/O   Final Result      1.  Head CT without contrast within normal limits. No evidence of acute cerebral infarction, hemorrhage or mass lesion.        .    MEDICAL RECORD  I have reviewed patient's medical record and pertinent results are listed above.    COURSE & MEDICAL DECISION MAKING  I have reviewed any medical record  information, laboratory studies and radiographic results as noted above.  This patient presents with multiple seizures in the setting of possible alcohol withdrawal.  He does clinically have alcohol on board, and he admits to this as well.  His wife points out however, that his drinking is been less than usual, and she suspects he is withdrawing.    IVF are given as the patient is kept n.p.o.  As well as his mild tachycardia.  He responds well to this.  He is given a detox bag.    CT of his head shows no mass.  Clinically do not think this is infection.    His CMP is still pending.  We will continue to try to get results of this.    At this point we will go ahead and put him in the hospital for medical detoxification from alcohol.  Case discussed with the [facility] hospitalist.  He is admitted.    Addendum: CMP shows a mild acidosis    FINAL IMPRESSION  1. Alcohol withdrawal syndrome with complication (HCC)    2. Seizure (HCC)    3. Alcoholism (HCC)           This dictation was created using voice recognition software.    Electronically signed by: Santy Ghotra, 9/7/2018 4:33 PM

## 2018-09-07 NOTE — ED TRIAGE NOTES
"Pt to ed with c/o seizures. Pt reports that he was had a pint of vodka today and had a seizure. Pt reports that \"if I drink to much I have seizures and if I don't have enough to drink I have seizures. I was just in here yesterday.\"  "

## 2018-09-08 PROBLEM — E87.20 ACIDOSIS: Status: RESOLVED | Noted: 2018-09-07 | Resolved: 2018-09-08

## 2018-09-08 PROBLEM — K70.10 ALCOHOLIC HEPATITIS: Status: RESOLVED | Noted: 2018-09-07 | Resolved: 2018-09-08

## 2018-09-08 LAB
ALBUMIN SERPL BCP-MCNC: 3.7 G/DL (ref 3.2–4.9)
ALBUMIN/GLOB SERPL: 1.5 G/DL
ALP SERPL-CCNC: 40 U/L (ref 30–99)
ALT SERPL-CCNC: 50 U/L (ref 2–50)
AMMONIA PLAS-SCNC: 36 UMOL/L (ref 11–45)
ANION GAP SERPL CALC-SCNC: 7 MMOL/L (ref 0–11.9)
AST SERPL-CCNC: 34 U/L (ref 12–45)
BASOPHILS # BLD AUTO: 0.6 % (ref 0–1.8)
BASOPHILS # BLD: 0.03 K/UL (ref 0–0.12)
BILIRUB SERPL-MCNC: 0.3 MG/DL (ref 0.1–1.5)
BUN SERPL-MCNC: 14 MG/DL (ref 8–22)
CALCIUM SERPL-MCNC: 9 MG/DL (ref 8.5–10.5)
CHLORIDE SERPL-SCNC: 110 MMOL/L (ref 96–112)
CHOLEST SERPL-MCNC: 132 MG/DL (ref 100–199)
CO2 SERPL-SCNC: 27 MMOL/L (ref 20–33)
CREAT SERPL-MCNC: 0.81 MG/DL (ref 0.5–1.4)
EOSINOPHIL # BLD AUTO: 0.21 K/UL (ref 0–0.51)
EOSINOPHIL NFR BLD: 4.3 % (ref 0–6.9)
ERYTHROCYTE [DISTWIDTH] IN BLOOD BY AUTOMATED COUNT: 43.8 FL (ref 35.9–50)
GLOBULIN SER CALC-MCNC: 2.4 G/DL (ref 1.9–3.5)
GLUCOSE SERPL-MCNC: 99 MG/DL (ref 65–99)
HCT VFR BLD AUTO: 42.5 % (ref 42–52)
HDLC SERPL-MCNC: 41 MG/DL
HGB BLD-MCNC: 14.5 G/DL (ref 14–18)
IMM GRANULOCYTES # BLD AUTO: 0.02 K/UL (ref 0–0.11)
IMM GRANULOCYTES NFR BLD AUTO: 0.4 % (ref 0–0.9)
LDLC SERPL CALC-MCNC: 55 MG/DL
LYMPHOCYTES # BLD AUTO: 2.3 K/UL (ref 1–4.8)
LYMPHOCYTES NFR BLD: 47.2 % (ref 22–41)
MAGNESIUM SERPL-MCNC: 1.9 MG/DL (ref 1.5–2.5)
MCH RBC QN AUTO: 32.2 PG (ref 27–33)
MCHC RBC AUTO-ENTMCNC: 34.1 G/DL (ref 33.7–35.3)
MCV RBC AUTO: 94.4 FL (ref 81.4–97.8)
MONOCYTES # BLD AUTO: 0.58 K/UL (ref 0–0.85)
MONOCYTES NFR BLD AUTO: 11.9 % (ref 0–13.4)
NEUTROPHILS # BLD AUTO: 1.73 K/UL (ref 1.82–7.42)
NEUTROPHILS NFR BLD: 35.6 % (ref 44–72)
NRBC # BLD AUTO: 0 K/UL
NRBC BLD-RTO: 0 /100 WBC
PHOSPHATE SERPL-MCNC: 4.3 MG/DL (ref 2.5–4.5)
PLATELET # BLD AUTO: 220 K/UL (ref 164–446)
PMV BLD AUTO: 8.9 FL (ref 9–12.9)
POTASSIUM SERPL-SCNC: 3.8 MMOL/L (ref 3.6–5.5)
PROT SERPL-MCNC: 6.1 G/DL (ref 6–8.2)
RBC # BLD AUTO: 4.5 M/UL (ref 4.7–6.1)
SODIUM SERPL-SCNC: 144 MMOL/L (ref 135–145)
TRIGL SERPL-MCNC: 178 MG/DL (ref 0–149)
WBC # BLD AUTO: 4.9 K/UL (ref 4.8–10.8)

## 2018-09-08 PROCEDURE — 82140 ASSAY OF AMMONIA: CPT

## 2018-09-08 PROCEDURE — 85025 COMPLETE CBC W/AUTO DIFF WBC: CPT

## 2018-09-08 PROCEDURE — 36415 COLL VENOUS BLD VENIPUNCTURE: CPT

## 2018-09-08 PROCEDURE — 80053 COMPREHEN METABOLIC PANEL: CPT

## 2018-09-08 PROCEDURE — 770020 HCHG ROOM/CARE - TELE (206)

## 2018-09-08 PROCEDURE — 83735 ASSAY OF MAGNESIUM: CPT

## 2018-09-08 PROCEDURE — 80061 LIPID PANEL: CPT

## 2018-09-08 PROCEDURE — 84100 ASSAY OF PHOSPHORUS: CPT

## 2018-09-08 PROCEDURE — 83036 HEMOGLOBIN GLYCOSYLATED A1C: CPT

## 2018-09-08 PROCEDURE — A9270 NON-COVERED ITEM OR SERVICE: HCPCS | Performed by: HOSPITALIST

## 2018-09-08 PROCEDURE — 700102 HCHG RX REV CODE 250 W/ 637 OVERRIDE(OP): Performed by: HOSPITALIST

## 2018-09-08 PROCEDURE — 99232 SBSQ HOSP IP/OBS MODERATE 35: CPT | Performed by: INTERNAL MEDICINE

## 2018-09-08 RX ADMIN — Medication 100 MG: at 05:21

## 2018-09-08 RX ADMIN — CHLORDIAZEPOXIDE HYDROCHLORIDE 25 MG: 25 CAPSULE ORAL at 23:45

## 2018-09-08 RX ADMIN — CHLORDIAZEPOXIDE HYDROCHLORIDE 50 MG: 25 CAPSULE ORAL at 05:21

## 2018-09-08 RX ADMIN — CHLORDIAZEPOXIDE HYDROCHLORIDE 50 MG: 25 CAPSULE ORAL at 12:25

## 2018-09-08 RX ADMIN — THERA TABS 1 TABLET: TAB at 05:21

## 2018-09-08 RX ADMIN — FOLIC ACID 1 MG: 1 TABLET ORAL at 05:21

## 2018-09-08 RX ADMIN — CHLORDIAZEPOXIDE HYDROCHLORIDE 25 MG: 25 CAPSULE ORAL at 18:12

## 2018-09-08 ASSESSMENT — LIFESTYLE VARIABLES
AGITATION: NORMAL ACTIVITY
ORIENTATION AND CLOUDING OF SENSORIUM: ORIENTED AND CAN DO SERIAL ADDITIONS
ANXIETY: NO ANXIETY (AT EASE)
AUDITORY DISTURBANCES: NOT PRESENT
VISUAL DISTURBANCES: NOT PRESENT
HEADACHE, FULLNESS IN HEAD: NOT PRESENT
ANXIETY: NO ANXIETY (AT EASE)
NAUSEA AND VOMITING: NO NAUSEA AND NO VOMITING
TOTAL SCORE: VERY MILD ITCHING, PINS AND NEEDLES SENSATION, BURNING OR NUMBNESS
TOTAL SCORE: VERY MILD ITCHING, PINS AND NEEDLES SENSATION, BURNING OR NUMBNESS
TREMOR: *
AUDITORY DISTURBANCES: NOT PRESENT
TOTAL SCORE: 1
ANXIETY: NO ANXIETY (AT EASE)
VISUAL DISTURBANCES: NOT PRESENT
AGITATION: NORMAL ACTIVITY
HEADACHE, FULLNESS IN HEAD: VERY MILD
PAROXYSMAL SWEATS: NO SWEAT VISIBLE
ORIENTATION AND CLOUDING OF SENSORIUM: ORIENTED AND CAN DO SERIAL ADDITIONS
AGITATION: *
AGITATION: *
VISUAL DISTURBANCES: NOT PRESENT
NAUSEA AND VOMITING: NO NAUSEA AND NO VOMITING
PAROXYSMAL SWEATS: BARELY PERCEPTIBLE SWEATING. PALMS MOIST
TOTAL SCORE: 4
ANXIETY: NO ANXIETY (AT EASE)
VISUAL DISTURBANCES: NOT PRESENT
VISUAL DISTURBANCES: NOT PRESENT
PAROXYSMAL SWEATS: BARELY PERCEPTIBLE SWEATING. PALMS MOIST
TOTAL SCORE: 0
PAROXYSMAL SWEATS: NO SWEAT VISIBLE
ORIENTATION AND CLOUDING OF SENSORIUM: ORIENTED AND CAN DO SERIAL ADDITIONS
TOTAL SCORE: 3
NAUSEA AND VOMITING: NO NAUSEA AND NO VOMITING
NAUSEA AND VOMITING: NO NAUSEA AND NO VOMITING
TREMOR: NO TREMOR
ORIENTATION AND CLOUDING OF SENSORIUM: ORIENTED AND CAN DO SERIAL ADDITIONS
VISUAL DISTURBANCES: NOT PRESENT
HEADACHE, FULLNESS IN HEAD: NOT PRESENT
TOTAL SCORE: 4
TOTAL SCORE: 4
AUDITORY DISTURBANCES: NOT PRESENT
PAROXYSMAL SWEATS: NO SWEAT VISIBLE
AUDITORY DISTURBANCES: NOT PRESENT
PAROXYSMAL SWEATS: BARELY PERCEPTIBLE SWEATING. PALMS MOIST
ANXIETY: NO ANXIETY (AT EASE)
TREMOR: *
HEADACHE, FULLNESS IN HEAD: NOT PRESENT
HEADACHE, FULLNESS IN HEAD: NOT PRESENT
AGITATION: NORMAL ACTIVITY
TREMOR: TREMOR NOT VISIBLE BUT CAN BE FELT, FINGERTIP TO FINGERTIP
NAUSEA AND VOMITING: NO NAUSEA AND NO VOMITING
ORIENTATION AND CLOUDING OF SENSORIUM: ORIENTED AND CAN DO SERIAL ADDITIONS
AUDITORY DISTURBANCES: NOT PRESENT
TREMOR: NO TREMOR
NAUSEA AND VOMITING: NO NAUSEA AND NO VOMITING
AGITATION: NORMAL ACTIVITY
ORIENTATION AND CLOUDING OF SENSORIUM: ORIENTED AND CAN DO SERIAL ADDITIONS
HEADACHE, FULLNESS IN HEAD: NOT PRESENT
ANXIETY: NO ANXIETY (AT EASE)
NAUSEA AND VOMITING: NO NAUSEA AND NO VOMITING
AUDITORY DISTURBANCES: NOT PRESENT
TREMOR: TREMOR NOT VISIBLE BUT CAN BE FELT, FINGERTIP TO FINGERTIP

## 2018-09-08 ASSESSMENT — ENCOUNTER SYMPTOMS
BLURRED VISION: 0
FEVER: 0
MYALGIAS: 0
HEARTBURN: 0
DIZZINESS: 0
COUGH: 0
DEPRESSION: 0

## 2018-09-08 ASSESSMENT — PAIN SCALES - GENERAL
PAINLEVEL_OUTOF10: 0

## 2018-09-08 NOTE — ED NOTES
"Pt family came out to [place] and asked if, \"nurse could make the girlfriend leave.\" Family reported that they just wanted to speak to pt without girlfriend in room. Educated pts family that I cannot make girlfriend leave if the pt wanted girlfriend in room. Spoke to family, girlfriend, and pt all together and reported that everyone needs to try to get along for the pt. Got social work involved as well. Social work came and spoke with family. Family left. Girlfriend is staying with family.   "

## 2018-09-08 NOTE — ASSESSMENT & PLAN NOTE
Patient with witnessed multiple seizures at home by wife likely due to alcohol withdrawal.  CT head unremarkable, no significant electrolyte abnormalities could cause seizures.  No head trauma.  No infectious etiology.  Continue with seizure precautions  Librium ordered.  As well as as needed Ativan for alcohol withdrawal.    Stable, no recurrent sz here.

## 2018-09-08 NOTE — H&P
Hospital Medicine History & Physical Note    Date of Service  9/7/2018    Primary Care Physician  Pcp Pt States None    Consultants  none    Code Status  full    Chief Complaint  seizures    History of Presenting Illness  28 y.o. male who presented 9/7/2018 with multiple seizures.  Patient is a chronic alcoholic has had gallon of alcohol use daily.  Incarcerated for 3 days started having severe DTs seen yesterday here at the emergency department for a seizure he was discharged after given some Librium.  He went home continued to drink however less than usual.  Has had multiple seizures throughout the day today witnessed by his wife.  She states he usually goes into withdrawals about 4-5 hours after his last drink.  His last drink was 1 PM today.  He has no known alleviating or exacerbating factors.  No head trauma.      Review of Systems  Review of Systems   Constitutional: Negative for chills and fever.   HENT: Negative for congestion, hearing loss and tinnitus.    Eyes: Negative for blurred vision, double vision and discharge.   Respiratory: Negative for cough, hemoptysis and shortness of breath.    Cardiovascular: Negative for chest pain, palpitations and leg swelling.   Gastrointestinal: Negative for abdominal pain, heartburn, nausea and vomiting.   Genitourinary: Negative for dysuria and flank pain.   Musculoskeletal: Negative for joint pain and myalgias.   Skin: Negative for rash.   Neurological: Positive for tremors and seizures. Negative for dizziness, sensory change, speech change, focal weakness and weakness.   Endo/Heme/Allergies: Negative for environmental allergies. Does not bruise/bleed easily.   Psychiatric/Behavioral: Negative for depression, hallucinations and substance abuse.       Past Medical History  Alcoholism    Surgical History  Reviewed and noncontributory     Family History  Reviewed and noncontributory    Social History   reports that he has been smoking.  He does not have any smokeless  tobacco history on file. He reports that he drinks alcohol. He reports that he does not use drugs.    Allergies  No Known Allergies    Medications  None       Physical Exam  Blood Pressure: 119/93   Temperature: 36.9 °C (98.5 °F)   Pulse: (!) 108   Respiration: (!) 21   Pulse Oximetry: 92 %     Physical Exam   Constitutional: He is oriented to person, place, and time. He appears well-developed and well-nourished.   HENT:   Head: Normocephalic and atraumatic.   Eyes: Pupils are equal, round, and reactive to light. Conjunctivae and EOM are normal.   Neck: Normal range of motion. Neck supple. No JVD present.   Cardiovascular: Regular rhythm, normal heart sounds and intact distal pulses.    No murmur heard.  tachycardic   Pulmonary/Chest: Effort normal and breath sounds normal. No respiratory distress. He exhibits no tenderness.   Abdominal: Soft. Bowel sounds are normal. He exhibits no distension. There is no tenderness.   Musculoskeletal: Normal range of motion. He exhibits no edema.   Neurological: He is alert and oriented to person, place, and time. No cranial nerve deficit. He exhibits normal muscle tone.   tremors   Skin: Skin is warm and dry. No erythema.   Psychiatric: Judgment and thought content normal.   anxious   Nursing note and vitals reviewed.      Laboratory:  Recent Labs      09/07/18   1556   WBC  7.9   RBC  5.10   HEMOGLOBIN  16.0   HEMATOCRIT  48.0   MCV  94.1   MCH  31.4   MCHC  33.3*   RDW  43.2   PLATELETCT  287   MPV  9.3     Recent Labs      09/06/18   1022   SODIUM  145   POTASSIUM  3.8   CHLORIDE  107   CO2  30   GLUCOSE  103*   BUN  9   CREATININE  1.11   CALCIUM  9.4     Recent Labs      09/06/18   1022   ALTSGPT  57*   ASTSGOT  42   ALKPHOSPHAT  48   TBILIRUBIN  0.3   GLUCOSE  103*                 No results found for: TROPONINI    Urinalysis:    No results found     Imaging:  CT-HEAD W/O   Final Result      1.  Head CT without contrast within normal limits. No evidence of acute cerebral  infarction, hemorrhage or mass lesion.            Assessment/Plan:  I anticipate this patient will require at least two midnights for appropriate medical management, necessitating inpatient admission.    * Alcohol withdrawal seizure (HCC)   Assessment & Plan    Patient with witnessed multiple seizures at home by wife likely due to alcohol withdrawal.  CT head unremarkable, no significant electrolyte abnormalities could cause seizures.  No head trauma.  No infectious etiology.  Continue with seizure precautions  Librium ordered.  As well as as needed Ativan for alcohol withdrawal.          Alcohol withdrawal (HCC)   Assessment & Plan    Currently intoxicated however showing early signs of withdrawal  We will continue with rally bag  Alcohol withdrawal protocol  Cardiac monitoring  Check electrolyte mag, phos        Tobacco abuse   Assessment & Plan    Greater than 10 minutes spent smoking cessation counseling.  Patient refused nicotine patch.  Also discussed the risk of cardiopulmonary disease associated with smoking.        Acidosis   Assessment & Plan    ivf and trend        Alcoholic hepatitis   Assessment & Plan    ivf and monitor   Consider liver ultrasound if no improvement            VTE prophylaxis: scd

## 2018-09-08 NOTE — ASSESSMENT & PLAN NOTE
Improved on CIWA protocol; not tremulous now with normal mental status and feeling better;  Continue current care; thiamine/mvi/folate  Advised alcohol cessation

## 2018-09-08 NOTE — ASSESSMENT & PLAN NOTE
Greater than 10 minutes spent smoking cessation counseling.  Patient refused nicotine patch.  Also discussed the risk of cardiopulmonary disease associated with smoking.
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
## 2018-09-08 NOTE — CARE PLAN
Problem: Safety  Goal: Will remain free from injury  Outcome: PROGRESSING AS EXPECTED  Appropriate fall precautions in place. Bed in lowest position. Call light and personal belongings within reach. Bed strip alarm on. Hourly rounding in place.    Problem: Infection  Goal: Will remain free from infection  Outcome: PROGRESSING AS EXPECTED  Hand hygiene performed before and after contact with patient.

## 2018-09-08 NOTE — PROGRESS NOTES
Pt observed, no change from original assessment pt difficult to arouse responds to pain, vss, no c/o pain at this time  Pt AAOx4, no other signs or symptoms of distress, fall precautions in place, call light within reach, all questions answered, will continue to monitor.

## 2018-09-08 NOTE — PROGRESS NOTES
Bedside report received from MORRIS Acosta. Initial patient assessment performed, chart and eMAR reviewed. See relevant flowsheet for details. Patient updated on plan of care for the day, with all questions answered. Call light and personal belongings are within reach, and bed is in the lowest position.

## 2018-09-08 NOTE — PROGRESS NOTES
[facility] Hospitalist Progress Note    Date of Service: 2018    Chief Complaint  28 y.o. male admitted 2018 with multiple seizures related to alcohol abuse; patient has been drinking half gallon liquor daily for a year.    Interval Problem Update  Feels better today; no recurrent sz; resolving withdrawal sx's; no new c/o; coherent.    Consultants/Specialty  none    Disposition  Home soon        Review of Systems   Constitutional: Negative for fever.   HENT: Negative for hearing loss.    Eyes: Negative for blurred vision.   Respiratory: Negative for cough.    Cardiovascular: Negative for chest pain.   Gastrointestinal: Negative for heartburn.   Genitourinary: Negative for dysuria.   Musculoskeletal: Negative for myalgias.   Skin: Negative for rash.   Neurological: Negative for dizziness.   Psychiatric/Behavioral: Negative for depression.      Physical Exam  Laboratory/Imaging   Hemodynamics  Temp (24hrs), Av.6 °C (97.9 °F), Min:36.3 °C (97.3 °F), Max:37 °C (98.6 °F)   Temperature: 37 °C (98.6 °F)  Pulse  Av.2  Min: 66  Max: 108 Heart Rate (Monitored): 86  Blood Pressure: 124/89, NIBP: (!) 93/33      Respiratory      Respiration: 18, Pulse Oximetry: 97 %        RUL Breath Sounds: Clear, RML Breath Sounds: Clear, RLL Breath Sounds: Diminished, JANE Breath Sounds: Clear, LLL Breath Sounds: Diminished    Fluids    Intake/Output Summary (Last 24 hours) at 18 1536  Last data filed at 18 0600   Gross per 24 hour   Intake             1000 ml   Output              600 ml   Net              400 ml       Nutrition  Orders Placed This Encounter   Procedures   • Diet Order Regular     Standing Status:   Standing     Number of Occurrences:   1     Order Specific Question:   Diet:     Answer:   Regular [1]     Physical Exam   Constitutional: He is oriented to person, place, and time. No distress.   HENT:   Head: Normocephalic.   Eyes: EOM are normal.   Neck: Neck supple.   Cardiovascular: Normal rate and  regular rhythm.    Pulmonary/Chest: Effort normal and breath sounds normal.   Abdominal: Soft. Bowel sounds are normal. He exhibits no distension. There is no tenderness.   Musculoskeletal: He exhibits no edema.   Neurological: He is alert and oriented to person, place, and time.   Skin: Skin is warm.   Psychiatric: His behavior is normal.       Recent Labs      09/07/18   1556  09/08/18   0359   WBC  7.9  4.9   RBC  5.10  4.50*   HEMOGLOBIN  16.0  14.5   HEMATOCRIT  48.0  42.5   MCV  94.1  94.4   MCH  31.4  32.2   MCHC  33.3*  34.1   RDW  43.2  43.8   PLATELETCT  287  220   MPV  9.3  8.9*     Recent Labs      09/06/18   1022  09/07/18   1556  09/08/18   0359   SODIUM  145  144  144   POTASSIUM  3.8  4.7  3.8   CHLORIDE  107  108  110   CO2  30  19*  27   GLUCOSE  103*  105*  99   BUN  9  14  14   CREATININE  1.11  0.80  0.81   CALCIUM  9.4  9.2  9.0             Recent Labs      09/08/18   0359   TRIGLYCERIDE  178*   HDL  41   LDL  55          Assessment/Plan     * Alcohol withdrawal seizure (HCC)   Assessment & Plan    Patient with witnessed multiple seizures at home by wife likely due to alcohol withdrawal.  CT head unremarkable, no significant electrolyte abnormalities could cause seizures.  No head trauma.  No infectious etiology.  Continue with seizure precautions  Librium ordered.  As well as as needed Ativan for alcohol withdrawal.    Stable, no recurrent sz here.          Alcohol withdrawal (HCC)   Assessment & Plan    Improved on CIWA protocol; not tremulous now with normal mental status and feeling better;  Continue current care; thiamine/mvi/folate  Advised alcohol cessation        Tobacco abuse- (present on admission)   Assessment & Plan    Greater than 10 minutes spent smoking cessation counseling.  Patient refused nicotine patch.  Also discussed the risk of cardiopulmonary disease associated with smoking.          Quality-Core Measures

## 2018-09-09 VITALS
RESPIRATION RATE: 19 BRPM | HEIGHT: 78 IN | TEMPERATURE: 97.7 F | HEART RATE: 52 BPM | WEIGHT: 293.43 LBS | BODY MASS INDEX: 33.95 KG/M2 | OXYGEN SATURATION: 97 % | DIASTOLIC BLOOD PRESSURE: 86 MMHG | SYSTOLIC BLOOD PRESSURE: 128 MMHG

## 2018-09-09 LAB
AMMONIA PLAS-SCNC: 36 UMOL/L (ref 11–45)
EST. AVERAGE GLUCOSE BLD GHB EST-MCNC: 114 MG/DL
HBA1C MFR BLD: 5.6 % (ref 0–5.6)
MAGNESIUM SERPL-MCNC: 1.8 MG/DL (ref 1.5–2.5)
PHOSPHATE SERPL-MCNC: 3.8 MG/DL (ref 2.5–4.5)

## 2018-09-09 PROCEDURE — 84100 ASSAY OF PHOSPHORUS: CPT

## 2018-09-09 PROCEDURE — 700102 HCHG RX REV CODE 250 W/ 637 OVERRIDE(OP): Performed by: HOSPITALIST

## 2018-09-09 PROCEDURE — 83735 ASSAY OF MAGNESIUM: CPT

## 2018-09-09 PROCEDURE — 99231 SBSQ HOSP IP/OBS SF/LOW 25: CPT | Performed by: INTERNAL MEDICINE

## 2018-09-09 PROCEDURE — A9270 NON-COVERED ITEM OR SERVICE: HCPCS | Performed by: HOSPITALIST

## 2018-09-09 PROCEDURE — 36415 COLL VENOUS BLD VENIPUNCTURE: CPT

## 2018-09-09 PROCEDURE — 82140 ASSAY OF AMMONIA: CPT

## 2018-09-09 RX ADMIN — THERA TABS 1 TABLET: TAB at 04:53

## 2018-09-09 RX ADMIN — FOLIC ACID 1 MG: 1 TABLET ORAL at 04:53

## 2018-09-09 RX ADMIN — CHLORDIAZEPOXIDE HYDROCHLORIDE 25 MG: 25 CAPSULE ORAL at 04:54

## 2018-09-09 RX ADMIN — Medication 100 MG: at 04:53

## 2018-09-09 ASSESSMENT — LIFESTYLE VARIABLES
ORIENTATION AND CLOUDING OF SENSORIUM: ORIENTED AND CAN DO SERIAL ADDITIONS
NAUSEA AND VOMITING: NO NAUSEA AND NO VOMITING
HEADACHE, FULLNESS IN HEAD: NOT PRESENT
NAUSEA AND VOMITING: NO NAUSEA AND NO VOMITING
TREMOR: NO TREMOR
TOTAL SCORE: 2
PAROXYSMAL SWEATS: BARELY PERCEPTIBLE SWEATING. PALMS MOIST
AGITATION: NORMAL ACTIVITY
PAROXYSMAL SWEATS: BARELY PERCEPTIBLE SWEATING. PALMS MOIST
HEADACHE, FULLNESS IN HEAD: NOT PRESENT
TREMOR: TREMOR NOT VISIBLE BUT CAN BE FELT, FINGERTIP TO FINGERTIP
VISUAL DISTURBANCES: NOT PRESENT
ORIENTATION AND CLOUDING OF SENSORIUM: ORIENTED AND CAN DO SERIAL ADDITIONS
VISUAL DISTURBANCES: NOT PRESENT
AUDITORY DISTURBANCES: NOT PRESENT
AGITATION: NORMAL ACTIVITY
ANXIETY: NO ANXIETY (AT EASE)
AUDITORY DISTURBANCES: NOT PRESENT
ANXIETY: NO ANXIETY (AT EASE)
TOTAL SCORE: 1

## 2018-09-09 ASSESSMENT — PAIN SCALES - GENERAL: PAINLEVEL_OUTOF10: 0

## 2018-09-09 NOTE — DISCHARGE SUMMARY
Discharge Summary    CHIEF COMPLAINT ON ADMISSION  Chief Complaint   Patient presents with   • Seizure       Reason for Admission  Seizures     Admission Date  9/7/2018    CODE STATUS  Prior    HPI & HOSPITAL COURSE  This is a 28 y.o. male here with  multiple seizures.  Patient is a chronic alcoholic has had gallon of alcohol use daily.  Incarcerated for 3 days started having severe DTs seen yesterday here at the emergency department for a seizure he was discharged after given some Librium.  He went home continued to drink however less than usual.  Has had multiple seizures throughout the day today witnessed by his wife.  She states he usually goes into withdrawals about 4-5 hours after his last drink.  His last drink was 1 PM today.  He has no known alleviating or exacerbating factors.  No head trauma.    CT head showed no acute problems.  Patient had no recurrent sz in the hospital with resolving withdrawal sx's.  He was advised strongly to stop drinking alcohol.  He decided to leave AMA on 9/9/18.    Therefore, he is discharged in guarded and stable condition to home with close outpatient follow-up.    The patient met 2-midnight criteria for an inpatient stay at the time of discharge.    Discharge Date  9/9/2018    FOLLOW UP ITEMS POST DISCHARGE  pcp next week    DISCHARGE DIAGNOSES  Principal Problem:    Alcohol withdrawal seizure (HCC) POA: Unknown  Active Problems:    Alcohol withdrawal (HCC) POA: Unknown    Tobacco abuse POA: Yes  Resolved Problems:    Alcoholic hepatitis POA: Unknown    Acidosis POA: Unknown      FOLLOW UP  No future appointments.  No follow-up provider specified.    MEDICATIONS ON DISCHARGE     Medication List      You have not been prescribed any medications.         Allergies  No Known Allergies    DIET  No orders of the defined types were placed in this encounter.      ACTIVITY  As tolerated.  Weight bearing as tolerated    CONSULTATIONS  none    PROCEDURES  See above    LABORATORY  Lab  Results   Component Value Date    SODIUM 144 09/08/2018    POTASSIUM 3.8 09/08/2018    CHLORIDE 110 09/08/2018    CO2 27 09/08/2018    GLUCOSE 99 09/08/2018    BUN 14 09/08/2018    CREATININE 0.81 09/08/2018        Lab Results   Component Value Date    WBC 4.9 09/08/2018    HEMOGLOBIN 14.5 09/08/2018    HEMATOCRIT 42.5 09/08/2018    PLATELETCT 220 09/08/2018        Total time of the discharge process exceeds 32 minutes.

## 2018-09-09 NOTE — CARE PLAN
Problem: Safety  Goal: Will remain free from injury  Outcome: PROGRESSING AS EXPECTED  Appropriate fall precautions in place. Bed in lowest position. Call light and personal belongings within reach. Treaded slipper socks on both feet. Hourly rounding in place.    Problem: Infection  Goal: Will remain free from infection  Outcome: PROGRESSING AS EXPECTED  Hand hygiene performed before and after contact with patient.

## 2018-09-09 NOTE — PROGRESS NOTES
MS SB/SR 45-79  .14/.08/.42  Down to 39  Hx 1.3-1.4sp   Airway patent, nasal mucosa clear, mouth with normal mucosa. Throat has no vesicles, no oropharyngeal exudates and uvula is midline. Clear tympanic membranes bilaterally. 2+ tonsils. No exudate or petechia. Bilateral anterior PATRICIA. No clavicular lymph nodes. No lymph nodes appreciated. Left and right nares clear

## 2018-09-09 NOTE — PROGRESS NOTES
Crow Edwards patient has chosen to leave the hospital against medical advice. The attending physician has not discharged the patient. Patient is not a risk to himself or others. I have discussed with the patient the following:  Physician has not determined patient is ready for discharge, Risks and consequences of leaving the hospital too soon and Benefit of continued hospitalization.      Discharge against medical advice form has been Signed.      Attending physician has been notified.

## 2018-09-10 LAB
EST. AVERAGE GLUCOSE BLD GHB EST-MCNC: 120 MG/DL
HBA1C MFR BLD: 5.8 % (ref 0–5.6)

## 2018-09-13 ENCOUNTER — HOSPITAL ENCOUNTER (EMERGENCY)
Facility: MEDICAL CENTER | Age: 28
End: 2018-09-13
Attending: EMERGENCY MEDICINE
Payer: MEDICAID

## 2018-09-13 ENCOUNTER — APPOINTMENT (OUTPATIENT)
Dept: RADIOLOGY | Facility: MEDICAL CENTER | Age: 28
End: 2018-09-13
Attending: EMERGENCY MEDICINE
Payer: MEDICAID

## 2018-09-13 VITALS
SYSTOLIC BLOOD PRESSURE: 141 MMHG | WEIGHT: 290.13 LBS | DIASTOLIC BLOOD PRESSURE: 101 MMHG | OXYGEN SATURATION: 94 % | RESPIRATION RATE: 18 BRPM | HEART RATE: 111 BPM | TEMPERATURE: 99.5 F | HEIGHT: 78 IN | BODY MASS INDEX: 33.57 KG/M2

## 2018-09-13 DIAGNOSIS — F10.10 ALCOHOL ABUSE: ICD-10-CM

## 2018-09-13 DIAGNOSIS — S01.81XA FACIAL LACERATION, INITIAL ENCOUNTER: ICD-10-CM

## 2018-09-13 PROCEDURE — 70486 CT MAXILLOFACIAL W/O DYE: CPT

## 2018-09-13 PROCEDURE — A9270 NON-COVERED ITEM OR SERVICE: HCPCS | Performed by: EMERGENCY MEDICINE

## 2018-09-13 PROCEDURE — 700102 HCHG RX REV CODE 250 W/ 637 OVERRIDE(OP): Performed by: EMERGENCY MEDICINE

## 2018-09-13 PROCEDURE — 90471 IMMUNIZATION ADMIN: CPT

## 2018-09-13 PROCEDURE — 90715 TDAP VACCINE 7 YRS/> IM: CPT | Performed by: EMERGENCY MEDICINE

## 2018-09-13 PROCEDURE — 99284 EMERGENCY DEPT VISIT MOD MDM: CPT

## 2018-09-13 PROCEDURE — 303747 HCHG EXTRA SUTURE

## 2018-09-13 PROCEDURE — 304999 HCHG REPAIR-SIMPLE/INTERMED LEVEL 1

## 2018-09-13 PROCEDURE — 304217 HCHG IRRIGATION SYSTEM

## 2018-09-13 PROCEDURE — 700111 HCHG RX REV CODE 636 W/ 250 OVERRIDE (IP): Performed by: EMERGENCY MEDICINE

## 2018-09-13 RX ORDER — LIDOCAINE HYDROCHLORIDE 10 MG/ML
20 INJECTION, SOLUTION INFILTRATION; PERINEURAL ONCE
Status: DISCONTINUED | OUTPATIENT
Start: 2018-09-13 | End: 2018-09-13 | Stop reason: HOSPADM

## 2018-09-13 RX ORDER — CEPHALEXIN 500 MG/1
500 CAPSULE ORAL ONCE
Status: COMPLETED | OUTPATIENT
Start: 2018-09-13 | End: 2018-09-13

## 2018-09-13 RX ADMIN — CEPHALEXIN 500 MG: 500 CAPSULE ORAL at 09:11

## 2018-09-13 RX ADMIN — CLOSTRIDIUM TETANI TOXOID ANTIGEN (FORMALDEHYDE INACTIVATED), CORYNEBACTERIUM DIPHTHERIAE TOXOID ANTIGEN (FORMALDEHYDE INACTIVATED), BORDETELLA PERTUSSIS TOXOID ANTIGEN (GLUTARALDEHYDE INACTIVATED), BORDETELLA PERTUSSIS FILAMENTOUS HEMAGGLUTININ ANTIGEN (FORMALDEHYDE INACTIVATED), BORDETELLA PERTUSSIS PERTACTIN ANTIGEN, AND BORDETELLA PERTUSSIS FIMBRIAE 2/3 ANTIGEN 0.5 ML: 5; 2; 2.5; 5; 3; 5 INJECTION, SUSPENSION INTRAMUSCULAR at 08:40

## 2018-09-13 NOTE — ED NOTES
"Pt requesting to leave because he didn't want to wait and \"just wanted to know if he needs stitches\". ERP at bedside   "

## 2018-09-13 NOTE — ED NOTES
"Pt approached nurses station stating that he \"feels like he is starting to go through alcohol withdrawals.\" ERP notified and obtaining recent set of vitals.   "
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
## 2018-09-13 NOTE — ED NOTES
Pt found wandering around ED. Preformed breathalyzer which resulted as 0.21, Pt again decided to leave AMA. ERP notified
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
## 2018-09-18 LAB — EKG IMPRESSION: NORMAL

## 2018-11-07 NOTE — ADDENDUM NOTE
Encounter addended by: Meenu Wakefield R.N. on: 11/6/2018  6:33 PM<BR>    Actions taken: Flowsheet accepted

## 2023-02-09 NOTE — PROGRESS NOTES
He's not eligible for Paxlovid due to the Diltiazem that he takes, there is a potential drug interaction between those two medications. If he's interested in taking an oral antiviral medication for his Covid infection it would have to be Molnupiravir, as that does not have the same drug interactions. Okay to send a prescription if he wants.   Received report from MORRIS Leary. Pt arrived to unit at 2130 via gurney escorted by ACLS RN. Assessment complete. VSS. Slow to arouse but responds to pain. No signs of distress noted at this time. Tele monitor in place. Monitor room notified. Pt c/o pain 0/10. Fall precautions and appropriate signs in place. Pt oriented to unit routine, call light/phone system within reach. Personal belongings within reach. RN extension number provided. Pt educated regarding fall precautions. Seizure precautions in place. Bed alarm is on. No isolation precautions in place. Pt denies any further needs at this time.